# Patient Record
(demographics unavailable — no encounter records)

---

## 2025-01-14 NOTE — HISTORY OF PRESENT ILLNESS
[FreeTextEntry1] : 31-year-old man, with a past medical history of dyslipidemia, presenting for establishment of cardiovascular care. Patient presently denies chest pain, shortness of breath/dyspnea on exertion, palpitations, dizziness/loss of consciousness, paroxysmal nocturnal dyspnea, orthopnea, headaches, abdominal pain, and lower extremity swelling. He previously underwent a cardiac workup prior to a ganglion cyst surgical removal, and was found to have an elevated LDL (118), as well as a grossly normal echocardiogram.

## 2025-01-14 NOTE — CARDIOLOGY SUMMARY
[de-identified] : 1/14/25: NSR at 65 bpm, early repolarization in precordium  [de-identified] : TTE 10/4/23: Normal LV size and systolic function (LV EF 59%0, no significant valvular disease

## 2025-01-14 NOTE — ASSESSMENT
[FreeTextEntry1] : 31-year-old man, with a past medical history of dyslipidemia, presenting for establishment of cardiovascular care.   HLD: Patient previously underwent a cardiac workup prior to a ganglion cyst surgical removal in October 2023, and was found to have an elevated LDL (118), as well as a grossly normal echocardiogram.  - deferring medical therapy at this time in favor of lifestyle interventions (diet and exercise) - aerobic exercise as tolerated to aim for approximately 30 minutes of activity 5 days a week - heart healthy diet low in sodium, sugars and saturated fats and high in fruits, vegetables and whole grains - will reassess lipid panel at this time  F/u in 1 year; or sooner PRN.

## 2025-01-14 NOTE — PHYSICAL EXAM
[No Acute Distress] : no acute distress [Normal Venous Pressure] : normal venous pressure [No Carotid Bruit] : no carotid bruit [Normal S1, S2] : normal S1, S2 [No Murmur] : no murmur [No Rub] : no rub [No Gallop] : no gallop [Clear Lung Fields] : clear lung fields [Good Air Entry] : good air entry [No Respiratory Distress] : no respiratory distress  [Soft] : abdomen soft [Non Tender] : non-tender [Normal Bowel Sounds] : normal bowel sounds [Normal Gait] : normal gait [No Edema] : no edema [No Cyanosis] : no cyanosis [No Clubbing] : no clubbing [No Varicosities] : no varicosities [Moves all extremities] : moves all extremities [No Focal Deficits] : no focal deficits [Normal Speech] : normal speech [Alert and Oriented] : alert and oriented [Normal memory] : normal memory

## 2025-04-30 NOTE — HISTORY OF PRESENT ILLNESS
[Right] : right hand dominant [FreeTextEntry1] : Duration of right ulnar-sided wrist pain for approximately 1 month.  At rest and activity modifications with minimal no improvement.  He thinks he did it while he was weightlifting.

## 2025-04-30 NOTE — ASSESSMENT
[FreeTextEntry1] : 1 month history of right ulnar-sided wrist pain localizing to TFCC which is corroborated by an MRI from April 11, 2025.  Options were discussed ranging conservative management, therapy, or surgery.  Surgery would be either arthroscopic or open but the MRI report does not give me enough information to make that decision.  The MRI would need to be personally reviewed in order to make a surgical decision.  The risks, benefits, alternatives and associated differential diagnosis was discussed with the patient. Options ranged from conservative care, therapy to surgical intervention were reviewed and all questions answered. Risks included incomplete resolution of symptoms, worsening of symptoms recurrence, tissue loss, functional loss and other risks associated with treatment of this condition Patient appeared to have an excellent understanding of the risks as well as differential diagnosis associated with this condition.Patient elected to proceed with a wrist widget and/or hard splint as well as a home program was outlined.  The patient elected holistic medicines such as topical Arnica cream and oral williams. Intermittent anti-inflammatories were also discussed and the risk associated with both holistic and traditional medicines were discussed and all questions answered.  If symptoms do not begin to improve over the next several weeks or resolve over the next few months to return to the office for reevaluation and may consider injection.

## 2025-04-30 NOTE — PHYSICAL EXAM
[de-identified] : Well-healed scar dorsal aspect of the right wrist no evidence of recurrence.  No tenderness of the scaphoid or scapholunate ligament or lunotriquetral ligament.  Flexion/extension 80/60 symmetric bilaterally Pronation/supination 80/80 No tenderness over the pisotriquetral or hamate hook no tenderness over the 4th or 5th CMC joints.  [de-identified] : PA lateral and oblique of the right wrist shows ulnar neutral variance without evidence of soft tissue calcifications or arthritis.  MRI from April 11, 2025 shows resolution of the previous dorsal ganglion cyst and attenuation of the proximal substance of the TFCC.  The fourth extensor compartment fluid is clinically asymptomatic on exam

## 2025-05-29 NOTE — HISTORY OF PRESENT ILLNESS
[FreeTextEntry1] : NPV-Rash [de-identified] :  Jerry Geovanny 32 y/o M presents for facial rash, drynes -scalp dryness -onset 1 month -has tried OTC lotion and washes but no changes -has tried different shampoos but does not seem to like its helping -sometimes itchy

## 2025-05-29 NOTE — ASSESSMENT
[FreeTextEntry1] :  #Seb Derm, progressing - present >1 year - +erythema and scale in alar creases, NLF, infraauricular (mild) and frontal scalp - Rx KTZ 2% sh as wash QD - RX HCT 2.5% cr QD PRN flare only - in reserve, Zoryve  #Hyperhidrosis - moist palms today - no tx yet - has been going on more than a year, chronic - not at goal given no tx and persisitent symptoms - Rx Drysol to palms QHS  RTC 6 weeks

## 2025-05-30 NOTE — HISTORY OF PRESENT ILLNESS
[FreeTextEntry1] : CPE + Eval [de-identified] : 31 M PMHx HLD and R wrist pain here to establish care. Last primary 1.5 years ago Fatigue and less interest in sex lately. Sleeping ~6 hours a night, falls asleep watching a movie, fatigued during the day, mavis says he snores. HLD -- managed with lifestyle torn ligament in wrist -- follows with Jose. Previously had ganglion cyst. Wearing wrist brace. FHx -- mother HLD, GM DM and CVA in 80s and triple bypass 60s/70s; father in [place] and had an amputation. SHx -- nothing except wrist

## 2025-05-30 NOTE — PHYSICAL EXAM
[No Acute Distress] : no acute distress [Normal Sclera/Conjunctiva] : normal sclera/conjunctiva [No Respiratory Distress] : no respiratory distress  [No Accessory Muscle Use] : no accessory muscle use [No Carotid Bruits] : no carotid bruits [No Edema] : there was no peripheral edema [No Extremity Clubbing/Cyanosis] : no extremity clubbing/cyanosis [Normal] : affect was normal and insight and judgment were intact

## 2025-05-30 NOTE — HEALTH RISK ASSESSMENT
[2 - 4 times a month (2 pts)] : 2-4 times a month (2 points) [1 or 2 (0 pts)] : 1 or 2 (0 points) [Monthly (2 pts)] : Monthly (2 points) [Yes] : In the past 12 months have you used drugs other than those required for medical reasons? Yes [No falls in past year] : Patient reported no falls in the past year [0] : 2) Feeling down, depressed, or hopeless: Not at all (0) [PHQ-2 Negative - No further assessment needed] : PHQ-2 Negative - No further assessment needed [HIV Test offered] : HIV Test offered [Hepatitis C test offered] : Hepatitis C test offered [With Family] : lives with family [Employed] : employed [College] : College [Significant Other] : lives with significant other [# Of Children ___] : has [unfilled] children [Sexually Active] : sexually active [Feels Safe at Home] : Feels safe at home [Fully functional (bathing, dressing, toileting, transferring, walking, feeding)] : Fully functional (bathing, dressing, toileting, transferring, walking, feeding) [Fully functional (using the telephone, shopping, preparing meals, housekeeping, doing laundry, using] : Fully functional and needs no help or supervision to perform IADLs (using the telephone, shopping, preparing meals, housekeeping, doing laundry, using transportation, managing medications and managing finances) [Reports normal functional visual acuity (ie: able to read med bottle)] : Reports normal functional visual acuity [Current] : Current [0-4] : 0-4 [Audit-CScore] : 4 [de-identified] : vape/combust cannabis [de-identified] : gym previously 4-5/week, less so with wrist [de-identified] : eating more fish and reducing fast food; high protein [[id number]] : 0 [Change in mental status noted] : No change in mental status noted [High Risk Behavior] : no high risk behavior [Reports changes in hearing] : Reports no changes in hearing [Reports changes in vision] : Reports no changes in vision [Reports changes in dental health] : Reports no changes in dental health [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

## 2025-07-25 NOTE — HISTORY OF PRESENT ILLNESS
[FreeTextEntry1] : RPA- f/u seb derm, hyperhidrosis [de-identified] :  Jerry Geovanny 30 y/o M presents for F/U seb derm, using ket shampoo and hc 2.5% when needed, face is resolved, scalp is still reoccuring but better, refills needed on keto shampoo.  #hyperhidrosis, pt did not recieve drysol, states pharmacy did not have it.  LV: 5/29/2025

## 2025-07-25 NOTE — ASSESSMENT
[FreeTextEntry1] :  #Seb Derm, impropving but not at goal - today greasy flake at vertex and occiput - emphasized importance of rinsing well--try shampoo brush in shower - pt has 11 refills on KTZ 2% Rx shampoo--will call pharmacy - no need for HCT at this time as face is clear  #Hyperhidrosis, chronic and progressive in the s/o not receiving his medication - moist palms today - will try sending Rx to [address] [place] [address] apothecary for easier filling - Rx Drysol to palms QHS  RTC 1 year, earlier if Drysol not working